# Patient Record
Sex: MALE | Race: WHITE | NOT HISPANIC OR LATINO | Employment: OTHER | URBAN - METROPOLITAN AREA
[De-identification: names, ages, dates, MRNs, and addresses within clinical notes are randomized per-mention and may not be internally consistent; named-entity substitution may affect disease eponyms.]

---

## 2018-07-03 ENCOUNTER — APPOINTMENT (EMERGENCY)
Dept: RADIOLOGY | Facility: HOSPITAL | Age: 68
DRG: 694 | End: 2018-07-03
Payer: MEDICARE

## 2018-07-03 ENCOUNTER — APPOINTMENT (INPATIENT)
Dept: RADIOLOGY | Facility: HOSPITAL | Age: 68
DRG: 694 | End: 2018-07-03
Payer: MEDICARE

## 2018-07-03 ENCOUNTER — HOSPITAL ENCOUNTER (INPATIENT)
Facility: HOSPITAL | Age: 68
LOS: 2 days | Discharge: HOME | DRG: 694 | End: 2018-07-05
Attending: GENERAL PRACTICE | Admitting: GENERAL PRACTICE
Payer: MEDICARE

## 2018-07-03 DIAGNOSIS — N13.5 URETERAL OBSTRUCTION: ICD-10-CM

## 2018-07-03 DIAGNOSIS — N13.30 HYDRONEPHROSIS: Primary | ICD-10-CM

## 2018-07-03 DIAGNOSIS — E66.01 MORBID OBESITY (HCC): ICD-10-CM

## 2018-07-03 DIAGNOSIS — R79.89 ELEVATED SERUM CREATININE: ICD-10-CM

## 2018-07-03 PROBLEM — K86.2 PANCREAS CYST: Status: ACTIVE | Noted: 2018-07-03

## 2018-07-03 PROBLEM — E11.49 TYPE 2 DIABETES MELLITUS WITH NEUROLOGIC COMPLICATION, WITH LONG-TERM CURRENT USE OF INSULIN (HCC): Status: ACTIVE | Noted: 2018-07-03

## 2018-07-03 PROBLEM — Z79.4 TYPE 2 DIABETES MELLITUS WITH NEUROLOGIC COMPLICATION, WITH LONG-TERM CURRENT USE OF INSULIN (HCC): Status: ACTIVE | Noted: 2018-07-03
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
## 2018-07-03 PROBLEM — I10 HYPERTENSION: Status: ACTIVE | Noted: 2018-07-03
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
## 2018-07-03 PROBLEM — E78.5 HYPERLIPIDEMIA: Status: ACTIVE | Noted: 2018-07-03

## 2018-07-03 LAB
ABO GROUP BLD: NORMAL
ANION GAP SERPL CALCULATED.3IONS-SCNC: 11 MMOL/L (ref 4–13)
APTT PPP: 25 SECONDS (ref 24–36)
BACTERIA UR QL AUTO: ABNORMAL /HPF
BASE EXCESS BLDA CALC-SCNC: 1 MMOL/L (ref -2–3)
BASOPHILS # BLD AUTO: 0.02 THOUSANDS/ΜL (ref 0–0.1)
BASOPHILS NFR BLD AUTO: 0 % (ref 0–1)
BILIRUB UR QL STRIP: NEGATIVE
BLD GP AB SCN SERPL QL: NEGATIVE
BUN SERPL-MCNC: 46 MG/DL (ref 5–25)
CA-I BLD-SCNC: 1.14 MMOL/L (ref 1.12–1.32)
CALCIUM SERPL-MCNC: 9.2 MG/DL (ref 8.3–10.1)
CHLORIDE SERPL-SCNC: 108 MMOL/L (ref 100–108)
CLARITY UR: CLEAR
CO2 SERPL-SCNC: 24 MMOL/L (ref 21–32)
COLOR UR: YELLOW
CREAT SERPL-MCNC: 3.39 MG/DL (ref 0.6–1.3)
EOSINOPHIL # BLD AUTO: 0.01 THOUSAND/ΜL (ref 0–0.61)
EOSINOPHIL NFR BLD AUTO: 0 % (ref 0–6)
ERYTHROCYTE [DISTWIDTH] IN BLOOD BY AUTOMATED COUNT: 13.6 % (ref 11.6–15.1)
GFR SERPL CREATININE-BSD FRML MDRD: 17 ML/MIN/1.73SQ M
GLUCOSE SERPL-MCNC: 173 MG/DL (ref 65–140)
GLUCOSE SERPL-MCNC: 185 MG/DL (ref 65–140)
GLUCOSE SERPL-MCNC: 188 MG/DL (ref 65–140)
GLUCOSE SERPL-MCNC: 193 MG/DL (ref 65–140)
GLUCOSE UR STRIP-MCNC: NEGATIVE MG/DL
HCO3 BLDA-SCNC: 25.5 MMOL/L (ref 24–30)
HCT VFR BLD AUTO: 42.6 % (ref 36.5–49.3)
HCT VFR BLD CALC: 40 % (ref 36.5–49.3)
HGB BLD-MCNC: 13.9 G/DL (ref 12–17)
HGB BLDA-MCNC: 13.6 G/DL (ref 12–17)
HGB UR QL STRIP.AUTO: ABNORMAL
IMM GRANULOCYTES # BLD AUTO: 0.07 THOUSAND/UL (ref 0–0.2)
IMM GRANULOCYTES NFR BLD AUTO: 1 % (ref 0–2)
INR PPP: 1.02 (ref 0.86–1.17)
KETONES UR STRIP-MCNC: ABNORMAL MG/DL
LEUKOCYTE ESTERASE UR QL STRIP: ABNORMAL
LYMPHOCYTES # BLD AUTO: 0.97 THOUSANDS/ΜL (ref 0.6–4.47)
LYMPHOCYTES NFR BLD AUTO: 9 % (ref 14–44)
MCH RBC QN AUTO: 29.4 PG (ref 26.8–34.3)
MCHC RBC AUTO-ENTMCNC: 32.6 G/DL (ref 31.4–37.4)
MCV RBC AUTO: 90 FL (ref 82–98)
MONOCYTES # BLD AUTO: 0.58 THOUSAND/ΜL (ref 0.17–1.22)
MONOCYTES NFR BLD AUTO: 5 % (ref 4–12)
NEUTROPHILS # BLD AUTO: 9.15 THOUSANDS/ΜL (ref 1.85–7.62)
NEUTS SEG NFR BLD AUTO: 85 % (ref 43–75)
NITRITE UR QL STRIP: NEGATIVE
NON-SQ EPI CELLS URNS QL MICRO: ABNORMAL /HPF
NRBC BLD AUTO-RTO: 0 /100 WBCS
PCO2 BLD: 27 MMOL/L (ref 21–32)
PCO2 BLD: 41 MM HG (ref 42–50)
PH BLD: 7.4 [PH] (ref 7.3–7.4)
PH UR STRIP.AUTO: 6 [PH] (ref 4.5–8)
PLATELET # BLD AUTO: 164 THOUSANDS/UL (ref 149–390)
PMV BLD AUTO: 9.3 FL (ref 8.9–12.7)
PO2 BLD: 62 MM HG (ref 35–45)
POTASSIUM BLD-SCNC: 4.7 MMOL/L (ref 3.5–5.3)
POTASSIUM SERPL-SCNC: 4.6 MMOL/L (ref 3.5–5.3)
PROT UR STRIP-MCNC: ABNORMAL MG/DL
PROTHROMBIN TIME: 13.5 SECONDS (ref 11.8–14.2)
RBC # BLD AUTO: 4.72 MILLION/UL (ref 3.88–5.62)
RBC #/AREA URNS AUTO: ABNORMAL /HPF
RH BLD: POSITIVE
SAO2 % BLD FROM PO2: 91 % (ref 95–98)
SODIUM BLD-SCNC: 144 MMOL/L (ref 136–145)
SODIUM SERPL-SCNC: 143 MMOL/L (ref 136–145)
SP GR UR STRIP.AUTO: 1.01 (ref 1–1.03)
SPECIMEN EXPIRATION DATE: NORMAL
SPECIMEN SOURCE: ABNORMAL
UROBILINOGEN UR QL STRIP.AUTO: 0.2 E.U./DL
WBC # BLD AUTO: 10.8 THOUSAND/UL (ref 4.31–10.16)
WBC #/AREA URNS AUTO: ABNORMAL /HPF

## 2018-07-03 PROCEDURE — 99222 1ST HOSP IP/OBS MODERATE 55: CPT | Performed by: SURGERY

## 2018-07-03 PROCEDURE — 84295 ASSAY OF SERUM SODIUM: CPT

## 2018-07-03 PROCEDURE — 81001 URINALYSIS AUTO W/SCOPE: CPT | Performed by: EMERGENCY MEDICINE

## 2018-07-03 PROCEDURE — 96375 TX/PRO/DX INJ NEW DRUG ADDON: CPT

## 2018-07-03 PROCEDURE — 82330 ASSAY OF CALCIUM: CPT

## 2018-07-03 PROCEDURE — 82948 REAGENT STRIP/BLOOD GLUCOSE: CPT

## 2018-07-03 PROCEDURE — 99222 1ST HOSP IP/OBS MODERATE 55: CPT | Performed by: INTERNAL MEDICINE

## 2018-07-03 PROCEDURE — 96374 THER/PROPH/DIAG INJ IV PUSH: CPT

## 2018-07-03 PROCEDURE — 90715 TDAP VACCINE 7 YRS/> IM: CPT | Performed by: EMERGENCY MEDICINE

## 2018-07-03 PROCEDURE — 85610 PROTHROMBIN TIME: CPT | Performed by: GENERAL PRACTICE

## 2018-07-03 PROCEDURE — 70450 CT HEAD/BRAIN W/O DYE: CPT

## 2018-07-03 PROCEDURE — 85025 COMPLETE CBC W/AUTO DIFF WBC: CPT | Performed by: GENERAL PRACTICE

## 2018-07-03 PROCEDURE — 99285 EMERGENCY DEPT VISIT HI MDM: CPT

## 2018-07-03 PROCEDURE — 72192 CT PELVIS W/O DYE: CPT

## 2018-07-03 PROCEDURE — 82947 ASSAY GLUCOSE BLOOD QUANT: CPT

## 2018-07-03 PROCEDURE — 99222 1ST HOSP IP/OBS MODERATE 55: CPT | Performed by: PHYSICIAN ASSISTANT

## 2018-07-03 PROCEDURE — 36415 COLL VENOUS BLD VENIPUNCTURE: CPT

## 2018-07-03 PROCEDURE — 71045 X-RAY EXAM CHEST 1 VIEW: CPT

## 2018-07-03 PROCEDURE — 85730 THROMBOPLASTIN TIME PARTIAL: CPT | Performed by: GENERAL PRACTICE

## 2018-07-03 PROCEDURE — 86901 BLOOD TYPING SEROLOGIC RH(D): CPT | Performed by: GENERAL PRACTICE

## 2018-07-03 PROCEDURE — 80048 BASIC METABOLIC PNL TOTAL CA: CPT | Performed by: GENERAL PRACTICE

## 2018-07-03 PROCEDURE — 85014 HEMATOCRIT: CPT

## 2018-07-03 PROCEDURE — 86900 BLOOD TYPING SEROLOGIC ABO: CPT | Performed by: GENERAL PRACTICE

## 2018-07-03 PROCEDURE — 82803 BLOOD GASES ANY COMBINATION: CPT

## 2018-07-03 PROCEDURE — 74177 CT ABD & PELVIS W/CONTRAST: CPT

## 2018-07-03 PROCEDURE — 99223 1ST HOSP IP/OBS HIGH 75: CPT | Performed by: GENERAL PRACTICE

## 2018-07-03 PROCEDURE — 86850 RBC ANTIBODY SCREEN: CPT | Performed by: GENERAL PRACTICE

## 2018-07-03 PROCEDURE — 90471 IMMUNIZATION ADMIN: CPT

## 2018-07-03 PROCEDURE — 71260 CT THORAX DX C+: CPT

## 2018-07-03 PROCEDURE — 72125 CT NECK SPINE W/O DYE: CPT

## 2018-07-03 PROCEDURE — 73030 X-RAY EXAM OF SHOULDER: CPT

## 2018-07-03 PROCEDURE — 84132 ASSAY OF SERUM POTASSIUM: CPT

## 2018-07-03 RX ORDER — ONDANSETRON 2 MG/ML
4 INJECTION INTRAMUSCULAR; INTRAVENOUS EVERY 4 HOURS PRN
Status: DISCONTINUED | OUTPATIENT
Start: 2018-07-03 | End: 2018-07-05 | Stop reason: HOSPADM

## 2018-07-03 RX ORDER — SODIUM CHLORIDE 9 MG/ML
100 INJECTION, SOLUTION INTRAVENOUS CONTINUOUS
Status: DISCONTINUED | OUTPATIENT
Start: 2018-07-03 | End: 2018-07-04

## 2018-07-03 RX ORDER — INSULIN ASPART 100 [IU]/ML
60 INJECTION, SUSPENSION SUBCUTANEOUS
Status: DISCONTINUED | OUTPATIENT
Start: 2018-07-03 | End: 2018-07-03

## 2018-07-03 RX ORDER — GABAPENTIN 300 MG/1
300 CAPSULE ORAL 3 TIMES DAILY
Status: DISCONTINUED | OUTPATIENT
Start: 2018-07-03 | End: 2018-07-05 | Stop reason: HOSPADM

## 2018-07-03 RX ORDER — ACETAMINOPHEN 325 MG/1
650 TABLET ORAL EVERY 6 HOURS PRN
Status: DISCONTINUED | OUTPATIENT
Start: 2018-07-03 | End: 2018-07-05 | Stop reason: HOSPADM

## 2018-07-03 RX ORDER — HEPARIN SODIUM 5000 [USP'U]/ML
5000 INJECTION, SOLUTION INTRAVENOUS; SUBCUTANEOUS EVERY 8 HOURS SCHEDULED
Status: DISCONTINUED | OUTPATIENT
Start: 2018-07-03 | End: 2018-07-05 | Stop reason: HOSPADM

## 2018-07-03 RX ORDER — FUROSEMIDE 40 MG/1
40 TABLET ORAL DAILY
Status: ON HOLD | COMMUNITY
End: 2018-07-05

## 2018-07-03 RX ORDER — GABAPENTIN 600 MG/1
300 TABLET ORAL 3 TIMES DAILY
COMMUNITY

## 2018-07-03 RX ORDER — ATORVASTATIN CALCIUM 40 MG/1
40 TABLET, FILM COATED ORAL
Status: DISCONTINUED | OUTPATIENT
Start: 2018-07-03 | End: 2018-07-05 | Stop reason: HOSPADM

## 2018-07-03 RX ORDER — METOCLOPRAMIDE HCL 10 MG/2 ML
20 SYRINGE (ML) INJECTION ONCE
Status: DISCONTINUED | OUTPATIENT
Start: 2018-07-03 | End: 2018-07-03

## 2018-07-03 RX ORDER — METOPROLOL SUCCINATE 100 MG/1
100 TABLET, EXTENDED RELEASE ORAL DAILY
Status: DISCONTINUED | OUTPATIENT
Start: 2018-07-04 | End: 2018-07-05 | Stop reason: HOSPADM

## 2018-07-03 RX ORDER — ONDANSETRON 2 MG/ML
4 INJECTION INTRAMUSCULAR; INTRAVENOUS ONCE
Status: COMPLETED | OUTPATIENT
Start: 2018-07-03 | End: 2018-07-03

## 2018-07-03 RX ORDER — INSULIN ASPART 100 [IU]/ML
30 INJECTION, SUSPENSION SUBCUTANEOUS
Status: DISCONTINUED | OUTPATIENT
Start: 2018-07-04 | End: 2018-07-05 | Stop reason: HOSPADM

## 2018-07-03 RX ORDER — PROMETHAZINE HYDROCHLORIDE 25 MG/1
12.5 TABLET ORAL EVERY 6 HOURS PRN
Status: DISCONTINUED | OUTPATIENT
Start: 2018-07-03 | End: 2018-07-03

## 2018-07-03 RX ORDER — METOCLOPRAMIDE HYDROCHLORIDE 5 MG/ML
10 INJECTION INTRAMUSCULAR; INTRAVENOUS ONCE
Status: COMPLETED | OUTPATIENT
Start: 2018-07-03 | End: 2018-07-03

## 2018-07-03 RX ORDER — OXYCODONE HYDROCHLORIDE 5 MG/1
2.5 TABLET ORAL EVERY 4 HOURS PRN
Status: DISCONTINUED | OUTPATIENT
Start: 2018-07-03 | End: 2018-07-05 | Stop reason: HOSPADM

## 2018-07-03 RX ORDER — FENTANYL CITRATE 50 UG/ML
25 INJECTION, SOLUTION INTRAMUSCULAR; INTRAVENOUS ONCE
Status: COMPLETED | OUTPATIENT
Start: 2018-07-03 | End: 2018-07-03

## 2018-07-03 RX ORDER — METOPROLOL SUCCINATE 100 MG/1
100 TABLET, EXTENDED RELEASE ORAL DAILY
COMMUNITY

## 2018-07-03 RX ORDER — AMLODIPINE BESYLATE 10 MG/1
10 TABLET ORAL DAILY
COMMUNITY

## 2018-07-03 RX ORDER — ATORVASTATIN CALCIUM 40 MG/1
40 TABLET, FILM COATED ORAL DAILY
COMMUNITY

## 2018-07-03 RX ORDER — PROMETHAZINE HYDROCHLORIDE 25 MG/ML
25 INJECTION, SOLUTION INTRAMUSCULAR; INTRAVENOUS EVERY 4 HOURS PRN
Status: DISCONTINUED | OUTPATIENT
Start: 2018-07-03 | End: 2018-07-05 | Stop reason: HOSPADM

## 2018-07-03 RX ORDER — AMLODIPINE BESYLATE 10 MG/1
10 TABLET ORAL DAILY
Status: DISCONTINUED | OUTPATIENT
Start: 2018-07-04 | End: 2018-07-05 | Stop reason: HOSPADM

## 2018-07-03 RX ORDER — INSULIN ASPART 100 [IU]/ML
65 INJECTION, SUSPENSION SUBCUTANEOUS
Status: DISCONTINUED | OUTPATIENT
Start: 2018-07-04 | End: 2018-07-05 | Stop reason: HOSPADM

## 2018-07-03 RX ADMIN — IOHEXOL 100 ML: 350 INJECTION, SOLUTION INTRAVENOUS at 12:14

## 2018-07-03 RX ADMIN — SODIUM CHLORIDE 100 ML/HR: 0.9 INJECTION, SOLUTION INTRAVENOUS at 18:48

## 2018-07-03 RX ADMIN — ACETAMINOPHEN 650 MG: 325 TABLET ORAL at 23:32

## 2018-07-03 RX ADMIN — HEPARIN SODIUM 5000 UNITS: 5000 INJECTION, SOLUTION INTRAVENOUS; SUBCUTANEOUS at 22:11

## 2018-07-03 RX ADMIN — INSULIN LISPRO 1 UNITS: 100 INJECTION, SOLUTION INTRAVENOUS; SUBCUTANEOUS at 22:11

## 2018-07-03 RX ADMIN — TETANUS TOXOID, REDUCED DIPHTHERIA TOXOID AND ACELLULAR PERTUSSIS VACCINE, ADSORBED 0.5 ML: 5; 2.5; 8; 8; 2.5 SUSPENSION INTRAMUSCULAR at 12:47

## 2018-07-03 RX ADMIN — GABAPENTIN 300 MG: 300 CAPSULE ORAL at 22:11

## 2018-07-03 RX ADMIN — HYDROMORPHONE HYDROCHLORIDE 0.5 MG: 1 INJECTION, SOLUTION INTRAMUSCULAR; INTRAVENOUS; SUBCUTANEOUS at 18:48

## 2018-07-03 RX ADMIN — ONDANSETRON 4 MG: 2 INJECTION INTRAMUSCULAR; INTRAVENOUS at 12:00

## 2018-07-03 RX ADMIN — FENTANYL CITRATE 25 MCG: 50 INJECTION, SOLUTION INTRAMUSCULAR; INTRAVENOUS at 12:10

## 2018-07-03 RX ADMIN — ONDANSETRON 4 MG: 2 INJECTION, SOLUTION INTRAMUSCULAR; INTRAVENOUS at 14:47

## 2018-07-03 RX ADMIN — METOCLOPRAMIDE 10 MG: 5 INJECTION, SOLUTION INTRAMUSCULAR; INTRAVENOUS at 18:49

## 2018-07-03 NOTE — CONSULTS
Consult - Urology   Santos Thao  1950, 70 y o  male MRN: [id number]    Unit/Bed#: [facility] 646-39 Encounter: [id number]    Fall down stairs   Assessment & Plan    Could also account for flank pain, given recent trauma  Patient however reports flank pain that preceded the fall  Elevated serum creatinine   Assessment & Plan    ESTELLA on CKD  Baseline Cr 1 7  Ureteral obstruction   Assessment & Plan    Possible ureteral obstruction from 6 x 3 mm stone located either at the distal end of the right ureter or in the bladder, having recently passed  Confounding with difficult clinical picture in the presence of chronic kidney disease with acute renal injury on top of chronic kidney disease  He was given a dye load in the trauma [place] when these images were performed  His baseline creatinine is 1 7 and his creatinine is elevated to 3 39  Plan:  Prone CT pelvis to evaluate if distal stone is located in the bladder  * Hydronephrosis of right kidney   Assessment & Plan    Mild to moderate right hydronephrosis on CT with ureteral calculus x2   2 mm proximal right ureteral calculus and 3 x 6 mm distal right ureteral calculus versus stone in the bladder  Bedside rounds performed with Ama Santos RN  Discussed with Dr Ulices Li and Maggie Ferro with Nephrology  Subjective/Objective     Subjective:   History obtained from chart review, discussion with patient, discussion with [facility] and Nephrology and RN caring for the patient  This is a 25-year-old morbidly obese male being admitted to the hospital secondary to a trauma after falling down 12 stairs  As part of his workup, CT was performed  His past medical history is pertinent for insulin-dependent diabetes, morbid obesity, baseline CKD, history of kidney stones  He reports a renal ultrasound in the last couple of days at [facility] but these records are not available    He sees Nephrology as an outpatient also at [facility] and his baseline creatinine appears to be 1 7 after conversations with Nephrology  Urology just to see him relative to CT findings of right moderate hydronephrosis and right ureteral calculi x2, 2 mm right proximal ureteral stone and 3 x 6 mm distal right ureteral stone versus bladder stone near the ureteral orifice on the right  Patient reports a history of kidney stones requiring stent in the past   He also reports a renal ultrasound, performed for an unknown reason, recently  He reports he has had flank pain since that renal ultrasound was performed last Thursday and noted that the technician that was performing that ultrasound had the push quite deeply given his obesity in order to visualize the appropriate structures  He notes significant discomfort in his suprapubic area with ultrasound probe to that area last Thursday  He denies any renal colic prior to that ultrasound  He notes after the ultrasound he developed bilateral flank pain and back discomfort  On Saturday, 3 days ago it localized to the right side  He complains of nausea and vomiting  His last p o  intake was last night at dinner, and he was subsequently sick  He has had nothing to eat or drink today  He reports refractory nausea after multiple doses of Zofran in the ER  Currently he is voiding without dysuria, gross hematuria or other complaints  He reports he has been voiding well since his stent was placed 1 year ago and subsequently removed  However, in the last 2-4 days he reports a new onset of urgency and incontinence with an inability to make it to the bathroom  He reports a history significant for kidney stones requiring stent  His last intervention from Urology was 1 year ago and ultrasound on Thursday was for evaluation of stone burden but he denies any symptoms prior to that  Review of Systems   Constitutional: Negative for activity change, appetite change, chills, fatigue and fever     HENT: Negative for congestion, ear pain, mouth sores, nosebleeds, sneezing and sore throat  Eyes: Negative for pain and redness  Respiratory: Negative for apnea, cough, choking and shortness of breath  Cardiovascular: Negative for palpitations  Gastrointestinal: Negative for abdominal pain, blood in stool, constipation, diarrhea, nausea and vomiting  Endocrine: Negative for cold intolerance, heat intolerance and polyuria  Genitourinary: Positive for flank pain  Negative for difficulty urinating, dysuria, frequency, hematuria and urgency  Musculoskeletal: Positive for back pain  Negative for arthralgias, gait problem and myalgias  Skin: Negative for color change, pallor, rash and wound  Allergic/Immunologic: Negative for immunocompromised state  Neurological: Negative for dizziness, seizures, syncope, numbness and headaches  Hematological: Negative for adenopathy  Does not bruise/bleed easily  Psychiatric/Behavioral: Negative for agitation, behavioral problems, confusion, hallucinations and sleep disturbance  The patient is not nervous/anxious  All other systems reviewed and are negative  Objective:  Vitals: Blood pressure 141/72, pulse 95, temperature 97 9 °F (36 6 °C), temperature source Oral, resp  rate 20, height 5' 8" (1 727 m), weight (!) 148 kg (327 lb 1 6 oz), SpO2 96 %  ,Body mass index is 49 74 kg/m²  No intake or output data in the 24 hours ending 07/03/18 1615    Invasive Devices     Peripheral Intravenous Line            Peripheral IV 07/03/18 Left Antecubital less than 1 day    Peripheral IV 07/03/18 Left Forearm less than 1 day                Physical Exam   Constitutional: He is oriented to person, place, and time  He appears well-developed  He is cooperative  He does not appear ill  No distress  Morbidly obese 39-year-old male in no acute distress  Pleasant, well-appearing  Out of bed to the chair  Wife at the bedside    Answered questions appropriately throughout the examination  Able to participate in exam   Currently complaining of nausea but no significant pain  HENT:   Head: Normocephalic and atraumatic  Moist mucous membranes  Eyes: Conjunctivae and EOM are normal    Neck: Normal range of motion  Neck supple  No tracheal deviation present  Cardiovascular: Normal rate, regular rhythm and normal heart sounds  No murmur heard  Pulmonary/Chest: Effort normal and breath sounds normal  No respiratory distress  He has no wheezes  Good airflow bilaterally on deep inspiration  Abdominal: Soft  He exhibits no distension and no mass  There is no tenderness  Obese abdomen with hypoactive bowel sounds x4 quadrants  No rigidity rebound or guarding  No significant suprapubic tenderness or fullness noted  Deep palpation limited secondary to body habitus  Genitourinary:   Genitourinary Comments: Bilateral CVA without significant tenderness  Musculoskeletal: Normal range of motion  He exhibits no edema  Neurological: He is alert and oriented to person, place, and time  Skin: Skin is warm and dry  No rash noted  He is not diaphoretic  No erythema  No pallor  Psychiatric: He has a normal mood and affect  His behavior is normal  Judgment and thought content normal    Nursing note and vitals reviewed  History:    Past Medical History:   Diagnosis Date    Diabetes mellitus ([address] )     Hypercholesteremia     Hypertension     Kidney stone      Past Surgical History:   Procedure Laterality Date    EAR SURGERY       History reviewed  No pertinent family history    Social History     Social History    Marital status: /Civil Union     Spouse name: N/A    Number of children: N/A    Years of education: N/A     Social History Main Topics    Smoking status: Never Smoker    Smokeless tobacco: Never Used    Alcohol use No    Drug use: No    Sexual activity: Not Asked     Other Topics Concern    None     Social History Narrative    None Imaging:  Trauma CT chest abdomen pelvis from 7/3/2018 reviewed, both report and images  Enlarged right kidney with delayed nephrogram, moderate hydronephrosis and hydroureter  Right 2mm proximal ureteral calculus with 3x6mm distal right ureteral stone, so low it may be in the bladder  Imaging reviewed - both report and images personally reviewed       Labs:  Recent Labs      07/03/18   1156   WBC  10 80*     Recent Labs      07/03/18   1154  07/03/18   1156   HGB  13 6  13 9       Recent Labs      07/03/18   1156   CREATININE  3 39*     Mary Rodríguez PA-C  Date: 7/3/2018 Time: 4:15 PM

## 2018-07-03 NOTE — ASSESSMENT & PLAN NOTE
Mild to moderate right hydronephrosis on CT with ureteral calculus x2   2 mm proximal right ureteral calculus and 3 x 6 mm distal right ureteral calculus versus stone in the bladder
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
## 2018-07-03 NOTE — ASSESSMENT & PLAN NOTE
· CT revealed moderate hydronephrosis and hydroureter with a 2mm calculus in the proximal ureter and 5clr4db calculus either in the intramural portion of the distal most right ureter or the urinary bladder causing or has recently caused distal ureteral obstruction per the result report  · Urology consult placed - discussed with their on-call PA-C  · Patient reports that he had a recent (last Thursday) renal US   Unfortunately, I am unable to find any records from [facility] with these results or why this test was performed and patient is unsure himself

## 2018-07-03 NOTE — ASSESSMENT & PLAN NOTE
· Patient reports his home insulin regimen is as follows:  · 65 units 70/30 QAM and 60 units 70/30 at dinner  · He also takes metformin BID  · Order home insulin regimen  · Orals will be on hold while hospitalized  · Add SSI coverage  · Check HbA1c  · Diabetic diet once no longer NPO  · Continue home dose gabapentin for peripheral neuropathy

## 2018-07-03 NOTE — ASSESSMENT & PLAN NOTE
· Creatinine found to be 3 39 on admission  · Unclear of patient's baseline as I cannot find any records from [facility]  · He reports seeing Dr Chriss Holman of [facility]  · He had a recent (last Thursday) renal US - unable to find these results  · Nephrology consult  · Suspect patient has some underlying CKD, but this likely represents an ESTELLA in the setting of the above and poor PO intake with vomiting  · Metformin, losartan, and furosemide will be on hold for now

## 2018-07-03 NOTE — ASSESSMENT & PLAN NOTE
Possible ureteral obstruction from 6 x 3 mm stone located either at the distal end of the right ureter or in the bladder, having recently passed  Confounding with difficult clinical picture in the presence of chronic kidney disease with acute renal injury on top of chronic kidney disease  He was given a dye load in the trauma [place] when these images were performed  His baseline creatinine is 1 7 and his creatinine is elevated to 3 39  Plan:  Prone CT pelvis to evaluate if distal stone is located in the bladder

## 2018-07-03 NOTE — ASSESSMENT & PLAN NOTE
· CT of the chest abdomen and pelvis revealed 1 2 cm exophytic solid-appearing nodule arising from the pancreatic tail anteriorly image 2/55   Partially solid and cystic nodule arising from the cephalad aspect of the pancreatic tail measuring 1 8 cm image 2/56 "  Nonemergent, outpatient MRI was recommended by radiologist

## 2018-07-03 NOTE — ASSESSMENT & PLAN NOTE
· Home regimen is as follows per med-rec:  · Metoprolol succinate 100 mg daily, losartan 100 mg daily, amlodipine 10 mg daily  · Losartan will be on hold for now given renal function

## 2018-07-03 NOTE — ED NOTES
Patient updated on plan of care  Aware that urology and nephorolgy need to see him and that he needs to remain NPO    Patient and his wife aware that he is being admitted to the hospital   Voids 600 mls in urinal      Kelly Barrett RN  07/03/18 1232

## 2018-07-03 NOTE — PHYSICIAN ADVISOR
Current patient class: Inpatient  The patient is currently on Hospital Day: 1 at [address] [place] [address]      The patient was admitted to the hospital at [phone] on 7/3/18 for the following diagnosis:  Morbid obesity ([address] ) [E66 01]  Hydronephrosis [N13 30]  Ureteral obstruction [N13 5]  Injuries [T14 90XA]  Elevated serum creatinine [R79 89]       There is documentation in the medical record of an expected length of stay of at least 2 midnights  The patient is therefore expected to satisfy the 2 midnight benchmark and given the 2 midnight presumption is appropriate for INPATIENT ADMISSION  Given this expectation of a satisfying stay, CMS instructs us that the patient is most often appropriate for inpatient admission under part A provided medical necessity is documented in the chart  After review of the relevant documentation, labs, vital signs and test results, the patient is appropriate for INPATIENT ADMISSION  Admission to the hospital as an inpatient is a complex decision making process which requires the practitioner to consider the patients presenting complaint, history and physical examination and all relevant testing  With this in mind, in this case, the patient was deemed appropriate for INPATIENT ADMISSION  After review of the documentation and testing available at the time of the admission I concur with this clinical determination of medical necessity  Rationale is as follows:    Patient is a 57-year-old male who presented to the emergency room on 07/03/2018 after a traumatic fall down the steps  On CT scan imaging he was noted to have moderate right-sided hydronephrosis and hydroureter with suspected obstructing stone  Patient had complaints of nausea and vomiting and right-sided flank pain for the previous 6 days    Urology is consulted with recommendation for follow-up CT scan to localize the stone and possible intervention for the hydronephrosis depending on imaging findings  Patient is also found to be in acute renal failure with creatinine of 3 39 with a baseline of around 1 7  He is treated with intravenous fluids  Nephrology is consulted  Patient required intravenous fentanyl and hydromorphone for pain control  Inpatient hospitalization is appropriate and patient is expected to remain hospitalized for over 2 midnights      The patients vitals on arrival were ED Triage Vitals   Temperature Pulse Respirations Blood Pressure SpO2   07/03/18 1145 07/03/18 1145 07/03/18 1145 07/03/18 1145 07/03/18 1145   (!) 97 °F (36 1 °C) 92 20 (!) 181/76 99 %      Temp Source Heart Rate Source Patient Position - Orthostatic VS BP Location FiO2 (%)   07/03/18 1551 07/03/18 1300 07/03/18 1145 -- --   Oral Monitor Lying        Pain Score       07/03/18 1210       6           Past Medical History:   Diagnosis Date    Diabetes mellitus ([facility] [address] )     Hypercholesteremia     Hypertension     Kidney stone      Past Surgical History:   Procedure Laterality Date    EAR SURGERY             Consults have been placed to:   IP CONSULT TO UROLOGY  IP CONSULT TO NEPHROLOGY  IP CONSULT TO NUTRITION SERVICES  IP CONSULT TO CASE MANAGEMENT    Vitals:    07/03/18 1440 07/03/18 1444 07/03/18 1502 07/03/18 1551   BP:   166/79 141/72   Pulse: 92 102 90 95   Resp:   20 20   Temp:    97 9 °F (36 6 °C)   TempSrc:    Oral   SpO2:   95% 96%   Weight:    (!) 148 kg (327 lb 1 6 oz)   Height:    5' 8" (1 727 m)       Most recent labs:    Recent Labs      07/03/18   1156   WBC  10 80*   HGB  13 9   HCT  42 6   PLT  164   K  4 6   NA  143   CALCIUM  9 2   BUN  46*   CREATININE  3 39*   INR  1 02       Scheduled Meds:  Current Facility-Administered Medications:  acetaminophen 650 mg Oral Q6H PRN Rahul Ramírez PA-C   [START ON 7/4/2018] amLODIPine 10 mg Oral Daily Rahul Ramírez PA-C   atorvastatin 40 mg Oral Daily With [employer], KENDALL   gabapentin 300 mg Oral TID Rahul Ramírez PA-C   heparin (porcine) 5,000 Units Subcutaneous [facility] Andrea Thomas PA-C   HYDROmorphone 0 5 mg Intravenous Once Easton Lopez PA-C   insulin aspart protamine-insulin aspart 60 Units Subcutaneous Before [place], [place]   [START ON 7/4/2018] insulin aspart protamine-insulin aspart 65 Units Subcutaneous Daily Before Breakfast Andrea Thomas PA-C   insulin lispro 1-5 Units Subcutaneous TID AC Andrea Thomas PA-C   metoclopramide 10 mg Intravenous Once Easton Lopez PA-C   [START ON 7/4/2018] metoprolol succinate 100 mg Oral Daily Andrea Thomas PA-C   ondansetron 4 mg Intravenous Q4H PRN Andrea Thomas PA-C   oxyCODONE 2 5 mg Oral Q4H PRN Andrea Thomas PA-C   promethazine 25 mg Intravenous Q4H PRN Easton Lopez PA-C   sodium chloride 100 mL/hr Intravenous Continuous Andrea Thomas PA-C     Continuous Infusions:  sodium chloride 100 mL/hr     PRN Meds:   acetaminophen    ondansetron    oxyCODONE    promethazine    Surgical procedures (if appropriate):

## 2018-07-03 NOTE — CONSULTS
Consultation - Nephrology   Yuko Sánchez  70 y o  male MRN: [id number]  Unit/Bed#: ED 26 Encounter: [id number]    ASSESSMENT and PLAN:  1  Acute kidney injury:  Multifactorial, Suspect secondary to obstruction with nephrolithiasis, prerenal with decreased oral intake with nausea vomiting, along with medications to include losartan, furosemide, and metformin  Also received IV contrast  -CT scan with IV contrast: There is moderate hydronephrosis and hydroureter, with nephrolithiasis  -await Urology evaluation and recommendations  -will check urinalysis with micro  -avoid nephrotoxins  -avoid hypotension  -follow I/O, lab values in volume status  -agree with holding losartan, Furosemide, and metformin for now    2  Chronic kidney disease III:  Baseline creatinine 1 7-1 8 dating back to 2016  -follows Dr Allen Sargent at [facility] seen December, 2017  -etiology likely secondary to diabetes and hypertension    3  Hypertension:  BP at slightly elevated  -metoprolol and amlodipine continue per primary  -losartan on hold  -trend for now with increased pain status post fall    4  Right hydronephrosis:  CT scan reported nephrolithiasis  -urology consulted awaiting full evaluation and recommendations  -may require Benton catheter-will trend  -follows urology at [facility]    5  Status post fall:  Imaging did not reveal fractures or abnormalities    6  Incidental pancreatic cysts:  Seen on CT scan  -radiology recommended outpatient MRI follow-up      HISTORY OF PRESENT ILLNESS:  Requesting Physician: Dayami Summers DO  Reason for Consult: Cindy Lucas  is a 70 y o  male who was admitted after sustaining a fall  He reports his leg gave out and fell down 12 steps which was witnessed by his wife    He has a history of chronic kidney disease and follows Dr Allen Sargent from [address], hypertension, hypercholesteremia, diabetes, nephrolithiasis with previous stent placement and follows Urology at [facility]   Blood work revealed elevated creatinine 3 39 and a renal consultation is requested today for assistance in the management of ESTELLA  Per [facility] baseline creatinine 1 70 to 1 8 dating back to 2016  On discussion the patient states he recently was being worked up for kidney stones by his urologist and underwent ultrasound last Thursday at [facility], however he is not aware results  Unfortunately, at this time no hospital records are available through [facility]  The patient reports increased abdominal pain, left flank pain since his ultrasound, nausea and vomiting, decreased appetite, polyuria, and increased generalized pain  He reports taking all his medications to include furosemide, losartan, amlodipine, metformin, gabapentin and potassium  Medication list has been reviewed  He denies recent fevers, illness, weight loss, chest pain, shortness of Breath, dizziness, lightheadedness, dysuria, hematuria, or foaming of the urine  He reports taking at the most three tablets of Motrin 200 mg each since Thursday secondary to left flank pain along with Percocet without relief  CT scan of the chest abdomen pelvis with IV contrast reviewed  PAST MEDICAL HISTORY:  Past Medical History:   Diagnosis Date    Diabetes mellitus ([address] )     Kidney stone        PAST SURGICAL HISTORY:  Past Surgical History:   Procedure Laterality Date    EAR SURGERY         ALLERGIES:  Allergies   Allergen Reactions    Penicillins        SOCIAL HISTORY:  History   Alcohol Use No     History   Drug Use No     History   Smoking Status    Never Smoker   Smokeless Tobacco    Never Used       FAMILY HISTORY:  History reviewed  No pertinent family history      MEDICATIONS:    Current Facility-Administered Medications:     ondansetron (ZOFRAN) injection 4 mg, 4 mg, Intravenous, Q4H PRN, Octaviano Wilson PA-C, 4 mg at 07/03/18 1042    Current Outpatient Prescriptions:     amLODIPine (NORVASC) 10 mg tablet, Take 10 mg by mouth daily, Disp: , Rfl:     atorvastatin (LIPITOR) 40 mg tablet, Take 40 mg by mouth daily, Disp: , Rfl:     furosemide (LASIX) 40 mg tablet, Take 40 mg by mouth daily, Disp: , Rfl:     gabapentin (NEURONTIN) 600 MG tablet, Take 300 mg by mouth 3 (three) times a day, Disp: , Rfl:     insulin NPH-insulin regular (NovoLIN 70/30) 100 units/mL subcutaneous injection, Inject 65 Units under the skin 2 (two) times a day before meals, Disp: , Rfl:     LOSARTAN POTASSIUM PO, Take 100 mg by mouth daily, Disp: , Rfl:     metFORMIN (GLUCOPHAGE) 500 mg tablet, Take 500 mg by mouth 2 (two) times a day with meals, Disp: , Rfl:     metoprolol succinate (TOPROL-XL) 100 mg 24 hr tablet, Take 100 mg by mouth daily, Disp: , Rfl:     POTASSIUM CITRATE ER PO, Take 15 mEq by mouth 2 (two) times a day, Disp: , Rfl:     REVIEW OF SYSTEMS:  All the systems were reviewed and were negative except as documented on the HPI      PHYSICAL EXAM:  Current Weight: Weight - Scale: (!) 153 kg (338 lb)  First Weight: Weight - Scale: (!) 153 kg (338 lb)  Vitals:    07/03/18 1435 07/03/18 1440 07/03/18 1444 07/03/18 1502   BP: 167/83   166/79   Pulse:  92 102 90   Resp:    20   Temp:       SpO2:    95%   Weight:         No intake or output data in the 24 hours ending 07/03/18 1526     General: cooperative, not in acute distress  Skin: no rash, warm and dry  Eyes: pale conjunctivae, anicteric sclerae  ENT: moist lips and mucous membranes  Neck: supple, no JVD noted  Chest: clear breath sounds, decreased bases, no wheeze or crackles  CVS:  normal rate, regular rhythm  Abdomen: soft, non-tender, non-distended, normoactive bowel sounds  Extremities:  Trace edema bilaterally, right lower leg deformity; status post skin graft  Neuro: awake, alert  Psych: appropriate affect       Lab Results:     Results from last 7 days  Lab Units 07/03/18  1156 07/03/18  1154   WBC Thousand/uL 10 80*  --    HEMOGLOBIN g/dL 13 9  --    I STAT HEMOGLOBIN g/dl  --  13 6   HEMATOCRIT % 42 6  --    PLATELETS Thousands/uL 164  --    SODIUM mmol/L 143  --    POTASSIUM mmol/L 4 6  --    CHLORIDE mmol/L 108  --    CO2 mmol/L 24  --    BUN mg/dL 46*  --    CREATININE mg/dL 3 39*  --    CALCIUM mg/dL 9 2  --    GLUCOSE RANDOM mg/dL 185*  --    GLUCOSE, ISTAT mg/dl  --  188*       Other Studies:

## 2018-07-03 NOTE — ASSESSMENT & PLAN NOTE
Could also account for flank pain, given recent trauma  Patient however reports flank pain that preceded the fall

## 2018-07-03 NOTE — H&P
H&P Exam - Trauma   Liza Reynolds  70 y o  male MRN: [id number]  Unit/Bed#: ED 26 Encounter: [id number]    Assessment/Plan   Trauma Alert: Level B  Model of Arrival: Ambulance  Trauma Team: Attending Donnie Farrar, resident Dr Inocente Whitman   Consultants: None    Trauma Active Problems:   Abrasions on Rt shoulder and Rt arm  -Td     Abdominal pain  -CT H, C/A/P/C spine NEG  -25 Fentanyl    Nausea x 3 days  -4mg Zofran  ESTELLA  -Admit to medicine  -    Trauma Plan:    -Admit to medicine for evaluation of dehydration due to N/V x 3 days    Chief Complaint: Back pain, Rt Shoulder pain, abdominal pain, T/L spine tenderness    History of Present Illness   HPI:  Liza Reynolds  is a 70 y o  male who was a LV B trauma alert who presents via ambulance after a fall down approx 25 stairs  Patient states that he was feeling  nauseous for the past 3 days and was leaving his home when his Rt leg gave out, causing him to fall  He states that he fell down the 25 steps and knocked off the chair lift from the wall  Pt takes ASA and not other thinners  Denies chest pain, SOB, diarrhea, fevers, chills  Mechanism:Fall    Review of Systems   HENT: Negative  Cardiovascular: Negative  Gastrointestinal: Positive for abdominal pain and nausea  Reports nausea and vomiting for a few days   Endocrine: Negative  Genitourinary: Negative  History of kidney stones, per patient had an ultrasound completed last week   Musculoskeletal:        Right shoulder abrasion   Skin:        Old skin graft to RLE, patient reports he was run over by a horse and varriage years ago   Allergic/Immunologic: Negative  Hematological: Negative  Psychiatric/Behavioral: Negative  Historical Information   History is unobtainable from the patient and EMS staff        Past Medical History:   Diagnosis Date    Diabetes mellitus ([facility] [address] )     Kidney stone      Past Surgical History:   Procedure Laterality Date    EAR SURGERY Social History   History   Alcohol Use No     History   Drug Use No     History   Smoking Status    Never Smoker   Smokeless Tobacco    Never Used     Immunization History   Administered Date(s) Administered    Tdap 07/03/2018     Last Tetanus: Updated today  Family History: Non-contributory         Meds/Allergies   all current active meds have been reviewed    Allergies   Allergen Reactions    Penicillins          PHYSICAL EXAM    PE limited by: na    Objective   Vitals:   First set: Temperature: (!) 97 °F (36 1 °C) (07/03/18 1145)  Pulse: 92 (07/03/18 1145)  Respirations: 20 (07/03/18 1145)  Blood Pressure: (!) 181/76 (07/03/18 1145)    Primary Survey:   (A) Airway: Intact  (B) Breathing: CTA b/l  (C) Circulation: Pulses:   carotid  2/4, pedal  2/4, radial  2/4 and femoral  2/4  (D) Disabliity:  GCS Total:  15  (E) Expose:  Completed    Secondary Survey: (Click on Physical Exam tab above)  Physical Exam   Constitutional: He is oriented to person, place, and time  He appears well-developed and well-nourished  No distress  HENT:   Head: Normocephalic  Eyes: Conjunctivae and EOM are normal  Pupils are equal, round, and reactive to light  Neck: Neck supple  Cardiovascular: Normal rate  No murmur heard  Pulmonary/Chest: Effort normal and breath sounds normal  No stridor  No respiratory distress  He has no wheezes  He has no rales  He exhibits tenderness  TTP over Rt and LT chest wall, No flail chest or crepitus note  Abdominal: Soft  He exhibits no distension  There is tenderness in the left upper quadrant  There is no rebound  Musculoskeletal: He exhibits no edema, tenderness or deformity  Neurological: He is alert and oriented to person, place, and time  No cranial nerve deficit or sensory deficit  GCS eye subscore is 4  GCS verbal subscore is 5  GCS motor subscore is 6  Skin: Skin is warm and dry  He is not diaphoretic  Psychiatric: He has a normal mood and affect   His behavior is normal  Judgment and thought content normal    Nursing note and vitals reviewed        Invasive Devices     Peripheral Intravenous Line            Peripheral IV 07/03/18 Left Antecubital less than 1 day    Peripheral IV 07/03/18 Left Forearm less than 1 day                Lab Results: ISTAT: No components found for: VBG  Imaging/EKG Studies: FAST: Neg, Chest X-Ray: NEG, CT Chest: Neg, CT Scan Abdomen/Pelvis: Neg CT head NEG, CT C spine NEG     Code Status: No Order  Advance Directive and Living Will:      Power of :    POLST:
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
## 2018-07-03 NOTE — ASSESSMENT & PLAN NOTE
· See CT findings as per above  · Urology consult pending  · Patient was made NPO in ED  · Patient reports that he had kidney stones in the past, had a stent placed which he reports was later removed

## 2018-07-03 NOTE — SOCIAL WORK
CM responded to trauma alert  Pt was brought to the trauma bay via [facility] s/p fall down 12 steps at home  Fall was witnessed by wife  -LOC  Family en route
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
## 2018-07-04 LAB
ANION GAP SERPL CALCULATED.3IONS-SCNC: 5 MMOL/L (ref 4–13)
BUN SERPL-MCNC: 41 MG/DL (ref 5–25)
CALCIUM SERPL-MCNC: 8.2 MG/DL (ref 8.3–10.1)
CHLORIDE SERPL-SCNC: 107 MMOL/L (ref 100–108)
CK MB SERPL-MCNC: 1.1 % (ref 0–2.5)
CK MB SERPL-MCNC: 11.1 NG/ML (ref 0–5)
CK SERPL-CCNC: 979 U/L (ref 39–308)
CO2 SERPL-SCNC: 26 MMOL/L (ref 21–32)
CREAT SERPL-MCNC: 2.48 MG/DL (ref 0.6–1.3)
ERYTHROCYTE [DISTWIDTH] IN BLOOD BY AUTOMATED COUNT: 13.9 % (ref 11.6–15.1)
EST. AVERAGE GLUCOSE BLD GHB EST-MCNC: 146 MG/DL
GFR SERPL CREATININE-BSD FRML MDRD: 25 ML/MIN/1.73SQ M
GLUCOSE SERPL-MCNC: 112 MG/DL (ref 65–140)
GLUCOSE SERPL-MCNC: 117 MG/DL (ref 65–140)
GLUCOSE SERPL-MCNC: 117 MG/DL (ref 65–140)
GLUCOSE SERPL-MCNC: 118 MG/DL (ref 65–140)
GLUCOSE SERPL-MCNC: 123 MG/DL (ref 65–140)
GLUCOSE SERPL-MCNC: 137 MG/DL (ref 65–140)
HBA1C MFR BLD: 6.7 % (ref 4.2–6.3)
HCT VFR BLD AUTO: 37.2 % (ref 36.5–49.3)
HGB BLD-MCNC: 12 G/DL (ref 12–17)
MAGNESIUM SERPL-MCNC: 2.1 MG/DL (ref 1.6–2.6)
MCH RBC QN AUTO: 29.9 PG (ref 26.8–34.3)
MCHC RBC AUTO-ENTMCNC: 32.3 G/DL (ref 31.4–37.4)
MCV RBC AUTO: 93 FL (ref 82–98)
PHOSPHATE SERPL-MCNC: 3.5 MG/DL (ref 2.3–4.1)
PLATELET # BLD AUTO: 141 THOUSANDS/UL (ref 149–390)
PMV BLD AUTO: 10.2 FL (ref 8.9–12.7)
POTASSIUM SERPL-SCNC: 5.1 MMOL/L (ref 3.5–5.3)
RBC # BLD AUTO: 4.02 MILLION/UL (ref 3.88–5.62)
SODIUM SERPL-SCNC: 138 MMOL/L (ref 136–145)
WBC # BLD AUTO: 7.77 THOUSAND/UL (ref 4.31–10.16)

## 2018-07-04 PROCEDURE — 82948 REAGENT STRIP/BLOOD GLUCOSE: CPT

## 2018-07-04 PROCEDURE — 80048 BASIC METABOLIC PNL TOTAL CA: CPT | Performed by: NURSE PRACTITIONER

## 2018-07-04 PROCEDURE — G8979 MOBILITY GOAL STATUS: HCPCS

## 2018-07-04 PROCEDURE — 82550 ASSAY OF CK (CPK): CPT | Performed by: INTERNAL MEDICINE

## 2018-07-04 PROCEDURE — 83735 ASSAY OF MAGNESIUM: CPT | Performed by: NURSE PRACTITIONER

## 2018-07-04 PROCEDURE — 97162 PT EVAL MOD COMPLEX 30 MIN: CPT

## 2018-07-04 PROCEDURE — 85027 COMPLETE CBC AUTOMATED: CPT | Performed by: PHYSICIAN ASSISTANT

## 2018-07-04 PROCEDURE — 99232 SBSQ HOSP IP/OBS MODERATE 35: CPT | Performed by: UROLOGY

## 2018-07-04 PROCEDURE — 84100 ASSAY OF PHOSPHORUS: CPT | Performed by: NURSE PRACTITIONER

## 2018-07-04 PROCEDURE — 82553 CREATINE MB FRACTION: CPT | Performed by: INTERNAL MEDICINE

## 2018-07-04 PROCEDURE — G8978 MOBILITY CURRENT STATUS: HCPCS

## 2018-07-04 PROCEDURE — 83036 HEMOGLOBIN GLYCOSYLATED A1C: CPT | Performed by: PHYSICIAN ASSISTANT

## 2018-07-04 PROCEDURE — 99232 SBSQ HOSP IP/OBS MODERATE 35: CPT | Performed by: NURSE PRACTITIONER

## 2018-07-04 PROCEDURE — 99232 SBSQ HOSP IP/OBS MODERATE 35: CPT | Performed by: INTERNAL MEDICINE

## 2018-07-04 RX ADMIN — SODIUM CHLORIDE 100 ML/HR: 0.9 INJECTION, SOLUTION INTRAVENOUS at 04:25

## 2018-07-04 RX ADMIN — GABAPENTIN 300 MG: 300 CAPSULE ORAL at 09:13

## 2018-07-04 RX ADMIN — OXYCODONE HYDROCHLORIDE 2.5 MG: 5 TABLET ORAL at 17:26

## 2018-07-04 RX ADMIN — ACETAMINOPHEN 650 MG: 325 TABLET ORAL at 09:22

## 2018-07-04 RX ADMIN — INSULIN ASPART 30 UNITS: 100 INJECTION, SUSPENSION SUBCUTANEOUS at 17:24

## 2018-07-04 RX ADMIN — ATORVASTATIN CALCIUM 40 MG: 40 TABLET, FILM COATED ORAL at 17:24

## 2018-07-04 RX ADMIN — HEPARIN SODIUM 5000 UNITS: 5000 INJECTION, SOLUTION INTRAVENOUS; SUBCUTANEOUS at 22:11

## 2018-07-04 RX ADMIN — GABAPENTIN 300 MG: 300 CAPSULE ORAL at 22:11

## 2018-07-04 RX ADMIN — GABAPENTIN 300 MG: 300 CAPSULE ORAL at 15:13

## 2018-07-04 RX ADMIN — OXYCODONE HYDROCHLORIDE 2.5 MG: 5 TABLET ORAL at 06:39

## 2018-07-04 RX ADMIN — AMLODIPINE BESYLATE 10 MG: 10 TABLET ORAL at 09:13

## 2018-07-04 RX ADMIN — INSULIN ASPART 65 UNITS: 100 INJECTION, SUSPENSION SUBCUTANEOUS at 09:13

## 2018-07-04 RX ADMIN — METOPROLOL SUCCINATE 100 MG: 100 TABLET, EXTENDED RELEASE ORAL at 09:13

## 2018-07-04 RX ADMIN — HEPARIN SODIUM 5000 UNITS: 5000 INJECTION, SOLUTION INTRAVENOUS; SUBCUTANEOUS at 13:57

## 2018-07-04 RX ADMIN — HEPARIN SODIUM 5000 UNITS: 5000 INJECTION, SOLUTION INTRAVENOUS; SUBCUTANEOUS at 06:37

## 2018-07-04 RX ADMIN — ACETAMINOPHEN 650 MG: 325 TABLET ORAL at 15:13
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
## 2018-07-04 NOTE — ASSESSMENT & PLAN NOTE
· CT revealed moderate hydronephrosis and hydroureter with a 2mm calculus in the proximal ureter and [id number] calculus either in the intramural portion of the distal most right ureter or the urinary bladder causing or has recently caused distal ureteral obstruction per the result report  · Urology consult appreciated seems stone has passed to the bladder but now there is a second stone and  uro feel that pt will likely pass this stone in ureter that is dilated from first stone   · Due to increased creatinine would monitor over night before plan for discharge as creatine is improving   · Patient reports that he had a recent (last Thursday) renal US     ·  unable to find any records from [facility] with these results or why this test was performed and patient is unsure himself

## 2018-07-04 NOTE — ASSESSMENT & PLAN NOTE
· See CT findings as per above  · Urology consult appreciated   · Patient was made NPO in ED,   · Patient reports that he had kidney stones in the past, had a stent placed which he reports was later removed

## 2018-07-04 NOTE — PROGRESS NOTES
Patient seen and examined  The patient's main complaint is right shoulder pain  He states that his flank pain has completely resolved  CT of the pelvis yesterday did demonstrate that the stone had passed and was in the bladder  His creatinine is slowly improving  I discussed with the patient that he does have 1 smaller stone in the ureter  This should hopefully pass without difficulty given the dilation of the ureter from the initial stone  We will need to continue to monitor him until tomorrow to make sure his kidney function continues to improve and he does not have any recurrent pain due to the 2nd stone  Otherwise he should follow up closely with Urology in the outpatient setting in a few weeks

## 2018-07-04 NOTE — ASSESSMENT & PLAN NOTE
· CT of the chest abdomen and pelvis revealed 1 2 cm exophytic solid-appearing nodule arising from the pancreatic tail anteriorly image 2/55   Partially solid and cystic nodule arising from the cephalad aspect of the pancreatic tail measuring 1 8 cm image 2/56 "  Nonemergent, outpatient MRI was recommended by radiologist  · - will place on discharge paper work for pt

## 2018-07-04 NOTE — ASSESSMENT & PLAN NOTE
· Patient reports his home insulin regimen is as follows:  · 65 units 70/30 QAM and 60 units 70/30 at dinner  · He also takes metformin BID  · Order home insulin regimen  · Orals will be on hold while hospitalized  · Add SSI coverage  ·  HbA1c 6 7  · Diabetic diet once no longer NPO but on clears would look to increase diet   · Continue home dose gabapentin for peripheral neuropathy  (P) 155

## 2018-07-04 NOTE — PROGRESS NOTES
NEPHROLOGY PROGRESS NOTE   Adonay Gomes  70 y o  male MRN: [id number]  Unit/Bed#: [facility] 039-39 Encounter: [id number]  Reason for Consult:  Acute kidney injury    ASSESSMENT/PLAN:  1  Acute kidney injury, likely multifactorial, component of obstruction plus prerenal given poor intake also combination of NSAIDs plus angiotensin receptor blocker  2  Stage 3 chronic kidney disease, baseline creatinine 1 7-1 8  3  Right-sided hydronephrosis with hydroureter with recent stone passage  4  Status post fall -CPK mildly elevated at 979    PLAN:  · Overall renal function is improving  · Discontinue IV fluids, oral intake appears to be adequate  · Continue to hold angiotensin receptor blocker as well as diuretics    SUBJECTIVE:  Seen and examined  Patient awake alert  Right flank pain has resolved  Denies any significant chest pain or shortness of Breath  Appetite seems to be improving  Tolerating clear liquids  Review of Systems    OBJECTIVE:  Current Weight: Weight - Scale: (!) 148 kg (327 lb 4 8 oz)  Vitals:    07/03/18 1551 07/03/18 2330 07/04/18 0551 07/04/18 0718   BP: 141/72 145/74  161/84   BP Location:    Right arm   Pulse: 95 83  76   Resp: 20 20  20   Temp: 97 9 °F (36 6 °C) 98 5 °F (36 9 °C)  97 8 °F (36 6 °C)   TempSrc: Oral   Oral   SpO2: 96% 96%  96%   Weight: (!) 148 kg (327 lb 1 6 oz)  (!) 148 kg (327 lb 4 8 oz)    Height: 5' 8" (1 727 m)          Intake/Output Summary (Last 24 hours) at 07/04/18 1059  Last data filed at 07/04/18 0611   Gross per 24 hour   Intake          5118 32 ml   Output             1050 ml   Net          4068 32 ml       Physical Exam   Constitutional: He is oriented to person, place, and time  No distress  HENT:   Head: Normocephalic  Eyes: No scleral icterus  Neck: Neck supple  Cardiovascular: Normal rate and regular rhythm  Pulmonary/Chest: Effort normal and breath sounds normal    Abdominal: Soft  He exhibits distension  Musculoskeletal: He exhibits no edema  Neurological: He is alert and oriented to person, place, and time  Skin: Skin is warm and dry  Psychiatric: He has a normal mood and affect         Medications:    Current Facility-Administered Medications:     acetaminophen (TYLENOL) tablet 650 mg, 650 mg, Oral, Q6H PRN, Arun Lara PA-C, 650 mg at 07/04/18 1137    amLODIPine (NORVASC) tablet 10 mg, 10 mg, Oral, Daily, Arun Lara PA-C, 10 mg at 07/04/18 0913    atorvastatin (LIPITOR) tablet 40 mg, 40 mg, Oral, Daily With [employer], KENDALL    gabapentin (NEURONTIN) capsule 300 mg, 300 mg, Oral, TID, Arun Lara PA-C, 300 mg at 07/04/18 0913    heparin (porcine) subcutaneous injection 5,000 Units, 5,000 Units, Subcutaneous, Q8H [address], 5,000 Units at 07/04/18 0516 **AND** Platelet count, , , Once, Arun Lara PA-C    insulin aspart protamine-insulin aspart (NovoLOG 70/30) 100 units/mL subcutaneous injection 30 Units, 30 Units, Subcutaneous, Before [employer], KENDALL    insulin aspart protamine-insulin aspart (NovoLOG 70/30) 100 units/mL subcutaneous injection 65 Units, 65 Units, Subcutaneous, Daily Before Breakfast, Arun Lara PA-C, 65 Units at 07/04/18 0913    insulin lispro (HumaLOG) 100 units/mL subcutaneous injection 1-5 Units, 1-5 Units, Subcutaneous, TID AC **AND** Fingerstick Glucose (POCT), , , TID AC, Arun Lara PA-C    insulin lispro (HumaLOG) 100 units/mL subcutaneous injection 1-6 Units, 1-6 Units, Subcutaneous, HS, Vika Reza PA-C, 1 Units at 07/03/18 2211    metoprolol succinate (TOPROL-XL) 24 hr tablet 100 mg, 100 mg, Oral, Daily, Arun Lara PA-C, 100 mg at 07/04/18 0913    ondansetron (ZOFRAN) injection 4 mg, 4 mg, Intravenous, Q4H PRN, Arun Lara PA-C, 4 mg at 07/03/18 1447    oxyCODONE (ROXICODONE) IR tablet 2 5 mg, 2 5 mg, Oral, Q4H PRN, Arun Lara PA-C, 2 5 mg at 07/04/18 0639    pneumococcal 13-valent conjugate vaccine (PREVNAR-13) IM injection 0 5 mL, 0 5 mL, Intramuscular, Prior to discharge, Opal Fuentes,     promethazine (PHENERGAN) injection 25 mg, 25 mg, Intravenous, Q4H PRN, Adolphus Boas, PA-C    sodium chloride 0 9 % infusion, 100 mL/hr, Intravenous, Continuous, Rahul Ramírez PA-C, Last Rate: 100 mL/hr at 07/04/18 0425, 100 mL/hr at 07/04/18 0425    Laboratory Results:    Results from last 7 days  Lab Units 07/04/18  0549 07/03/18  1156 07/03/18  1154   WBC Thousand/uL 7 77 10 80*  --    HEMOGLOBIN g/dL 12 0 13 9  --    I STAT HEMOGLOBIN g/dl  --   --  13 6   HEMATOCRIT % 37 2 42 6  --    PLATELETS Thousands/uL 141* 164  --    SODIUM mmol/L 138 143  --    POTASSIUM mmol/L 5 1 4 6  --    CHLORIDE mmol/L 107 108  --    CO2 mmol/L 26 24  --    BUN mg/dL 41* 46*  --    CREATININE mg/dL 2 48* 3 39*  --    CALCIUM mg/dL 8 2* 9 2  --    MAGNESIUM mg/dL 2 1  --   --    PHOSPHORUS mg/dL 3 5  --   --    GLUCOSE RANDOM mg/dL 117 185*  --    GLUCOSE, ISTAT mg/dl  --   --  188*
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
## 2018-07-04 NOTE — PROGRESS NOTES
Prone CT pelvis revealed no evidence of distal ureteral calculus  5mm bladder calculus identified per the results report  Will place patient on diet now  Per nursing, patient only requesting to have ginger ale and Jell-o tonight  Will place on clear liquid diet for now  Discussed insulin regimen with Dr Bonny Peter  Last glucose check high at 193  Will cut edil's 70/30 dose in half  Check overnight blood sugar  Hypoglycemia protocol ordered
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
## 2018-07-04 NOTE — ASSESSMENT & PLAN NOTE
· Creatinine found to be 3 39 on admission now 2 48 (baseline about 1 7 to 1 8)  · He reports seeing Dr Shanda Sesay of [facility]  · He had a recent (last Thursday) renal US - unable to find these results  · Nephrology consult  · Suspect patient has some underlying CKD, but this likely represents an ESTELLA in the setting of the above and poor PO intake with vomiting  · - at this time fluids will be discontinued per renal   · Metformin, losartan, and furosemide will be on hold for now

## 2018-07-05 VITALS
HEIGHT: 68 IN | RESPIRATION RATE: 20 BRPM | SYSTOLIC BLOOD PRESSURE: 140 MMHG | HEART RATE: 67 BPM | TEMPERATURE: 98.4 F | WEIGHT: 315 LBS | OXYGEN SATURATION: 93 % | BODY MASS INDEX: 47.74 KG/M2 | DIASTOLIC BLOOD PRESSURE: 65 MMHG

## 2018-07-05 PROBLEM — N13.5 URETERAL OBSTRUCTION: Status: RESOLVED | Noted: 2018-07-03 | Resolved: 2018-07-05

## 2018-07-05 PROBLEM — R79.89 ELEVATED SERUM CREATININE: Status: RESOLVED | Noted: 2018-07-03 | Resolved: 2018-07-05
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
## 2018-07-05 PROBLEM — N17.9 AKI (ACUTE KIDNEY INJURY) (HCC): Status: RESOLVED | Noted: 2018-07-03 | Resolved: 2018-07-05

## 2018-07-05 LAB
ANION GAP SERPL CALCULATED.3IONS-SCNC: 5 MMOL/L (ref 4–13)
BUN SERPL-MCNC: 31 MG/DL (ref 5–25)
CALCIUM SERPL-MCNC: 8.4 MG/DL (ref 8.3–10.1)
CHLORIDE SERPL-SCNC: 106 MMOL/L (ref 100–108)
CO2 SERPL-SCNC: 28 MMOL/L (ref 21–32)
CREAT SERPL-MCNC: 1.94 MG/DL (ref 0.6–1.3)
GFR SERPL CREATININE-BSD FRML MDRD: 34 ML/MIN/1.73SQ M
GLUCOSE SERPL-MCNC: 129 MG/DL (ref 65–140)
GLUCOSE SERPL-MCNC: 133 MG/DL (ref 65–140)
POTASSIUM SERPL-SCNC: 4.2 MMOL/L (ref 3.5–5.3)
SODIUM SERPL-SCNC: 139 MMOL/L (ref 136–145)

## 2018-07-05 PROCEDURE — 82948 REAGENT STRIP/BLOOD GLUCOSE: CPT

## 2018-07-05 PROCEDURE — 80048 BASIC METABOLIC PNL TOTAL CA: CPT | Performed by: INTERNAL MEDICINE

## 2018-07-05 PROCEDURE — 99233 SBSQ HOSP IP/OBS HIGH 50: CPT | Performed by: NURSE PRACTITIONER

## 2018-07-05 PROCEDURE — 99232 SBSQ HOSP IP/OBS MODERATE 35: CPT | Performed by: NURSE PRACTITIONER

## 2018-07-05 PROCEDURE — 99239 HOSP IP/OBS DSCHRG MGMT >30: CPT | Performed by: INTERNAL MEDICINE

## 2018-07-05 RX ORDER — LOSARTAN POTASSIUM 100 MG/1
100 TABLET ORAL DAILY
Qty: 30 TABLET | Refills: 0 | Status: SHIPPED | OUTPATIENT
Start: 2018-07-12

## 2018-07-05 RX ORDER — FUROSEMIDE 40 MG/1
40 TABLET ORAL DAILY
Qty: 30 TABLET | Refills: 0 | Status: SHIPPED | OUTPATIENT
Start: 2018-07-12

## 2018-07-05 RX ADMIN — INSULIN ASPART 65 UNITS: 100 INJECTION, SUSPENSION SUBCUTANEOUS at 08:22

## 2018-07-05 RX ADMIN — OXYCODONE HYDROCHLORIDE 2.5 MG: 5 TABLET ORAL at 00:17

## 2018-07-05 RX ADMIN — METOPROLOL SUCCINATE 100 MG: 100 TABLET, EXTENDED RELEASE ORAL at 08:21

## 2018-07-05 RX ADMIN — HEPARIN SODIUM 5000 UNITS: 5000 INJECTION, SOLUTION INTRAVENOUS; SUBCUTANEOUS at 06:28

## 2018-07-05 RX ADMIN — AMLODIPINE BESYLATE 10 MG: 10 TABLET ORAL at 08:21

## 2018-07-05 RX ADMIN — GABAPENTIN 300 MG: 300 CAPSULE ORAL at 08:21

## 2018-07-05 RX ADMIN — OXYCODONE HYDROCHLORIDE 2.5 MG: 5 TABLET ORAL at 08:21

## 2018-07-05 NOTE — PROGRESS NOTES
UROLOGY PROGRESS NOTE   Patient Identifiers: Ross Das (MRN [id number])  Date of Service: 7/5/2018    Subjective:     24 HR EVENTS:   no significant events  Patient has  no complaints  He continues to have right shoulder discomfort  He denies any flank pain  Objective:     VITALS:    Vitals:    07/05/18 0657   BP: 140/65   Pulse: 67   Resp: 20   Temp: 98 4 °F (36 9 °C)   SpO2: 93%       INS & OUTS:  I/O last 24 hours:   In: 3348 7 [P O :2767; I V :581 7]  Out: 1200 [Urine:1200]    LABS:  Lab Results   Component Value Date    HGB 12 0 07/04/2018    HCT 37 2 07/04/2018    WBC 7 77 07/04/2018     (L) 07/04/2018   ]    Lab Results   Component Value Date     07/05/2018    K 4 2 07/05/2018     07/05/2018    CO2 28 07/05/2018    BUN 31 (H) 07/05/2018    CREATININE 1 94 (H) 07/05/2018    CALCIUM 8 4 07/05/2018    GLUCOSE 129 07/05/2018   ]    INPATIENT MEDS:    Current Facility-Administered Medications:     acetaminophen (TYLENOL) tablet 650 mg, 650 mg, Oral, Q6H PRN, Prabha Alexis PA-C, 650 mg at 07/04/18 1513    amLODIPine (NORVASC) tablet 10 mg, 10 mg, Oral, Daily, Prabha Alexis PA-C, 10 mg at 07/04/18 0913    atorvastatin (LIPITOR) tablet 40 mg, 40 mg, Oral, Daily With Rick Fox PA-C, 40 mg at 07/04/18 1724    gabapentin (NEURONTIN) capsule 300 mg, 300 mg, Oral, TID, Prabha Alexis PA-C, 300 mg at 07/04/18 2211    heparin (porcine) subcutaneous injection 5,000 Units, 5,000 Units, Subcutaneous, Q8H [address], 5,000 Units at 07/05/18 9086 **AND** Platelet count, , , Once, Prabha Alexis PA-C    insulin aspart protamine-insulin aspart (NovoLOG 70/30) 100 units/mL subcutaneous injection 30 Units, 30 Units, Subcutaneous, Before Rick Fox PA-C, 30 Units at 07/04/18 1724    insulin aspart protamine-insulin aspart (NovoLOG 70/30) 100 units/mL subcutaneous injection 65 Units, 65 Units, Subcutaneous, Daily Before Breakfast, Prabha Alexis PA-C, 65 Units at 07/04/18 0913    insulin lispro (HumaLOG) 100 units/mL subcutaneous injection 1-5 Units, 1-5 Units, Subcutaneous, TID AC **AND** Fingerstick Glucose (POCT), , , TID AC, David Alvarez PA-C    insulin lispro (HumaLOG) 100 units/mL subcutaneous injection 1-6 Units, 1-6 Units, Subcutaneous, HS, Hudson Schultz PA-C, 1 Units at 07/03/18 2211    metoprolol succinate (TOPROL-XL) 24 hr tablet 100 mg, 100 mg, Oral, Daily, David Alvarez PA-C, 100 mg at 07/04/18 0913    ondansetron (ZOFRAN) injection 4 mg, 4 mg, Intravenous, Q4H PRN, David Alvarez PA-C, 4 mg at 07/03/18 1447    oxyCODONE (ROXICODONE) IR tablet 2 5 mg, 2 5 mg, Oral, Q4H PRN, David Alvarez PA-C, 2 5 mg at 07/05/18 0017    pneumococcal 13-valent conjugate vaccine (PREVNAR-13) IM injection 0 5 mL, 0 5 mL, Intramuscular, Prior to discharge, Colan Laurie, DO    promethazine (PHENERGAN) injection 25 mg, 25 mg, Intravenous, Q4H PRN, Winnie Lainez PA-C      Physical Exam:     GEN: alert and oriented x 3    RESP: breathing comfortably with no accessory muscle use    ABD: soft, non-tender, non-distended   EXT: no significant peripheral edema   :  Voiding without difficulty, no gross hematuria or dysuria    RADIOLOGY:   CT pelvis wo contrast 7/3/18    VISUALIZED KIDNEYS/URETERS:  Kidney not visualized  No evidence of distal ureteral calculus  5 mm bladder calculus      REPRODUCTIVE ORGANS:  The prostate is enlarged      URINARY BLADDER:  Unremarkable  IMPRESSION:        1  No evidence of distal ureteral calculus  5 mm bladder calculus  2   Prostatomegaly  Posterior bladder soft tissue thickening (prostatic protrusion at the bladder base or other etiology?), correlate with nonemergent outpatient bladder ultrasound to exclude transitional cell carcinoma  Assessment:   71 y/o male who initially presented to ER with right-sided flank pain    CT scan identified 2 mm right proximal ureteral stone and 3 x 6 mm distal right ureteral stone versus bladder small stone with the right ureteral orifice  A repeat CT scan of the pelvis demonstrated that the stone had passed it was in the bladder  Creatinine continues to improve  It is now 1 94 from 2 48 yesterday  Negative CVA tenderness, patient denies any flank pain  Plan:   -Patient's creatinine continues to improve and he remains asymptomatic  At this time no further urologic intervention is required  Patient states that he has been managed by [facility] for several years  He has an upcoming appointment this month  Patient would like to continue care with them  Advised patient to call his primary urologist upon discharge from hospital   Patient is amenable with this plan  All questions answered  Urology signing off, please not hesitate to call with any questions or concerns      RN participated in rounds with AP  Plan of care discussed with patient and RN

## 2018-07-05 NOTE — PLAN OF CARE
GENITOURINARY - ADULT     Maintains or returns to baseline urinary function Progressing     Absence of urinary retention Progressing        Nutrition/Hydration-ADULT     Nutrient/Hydration intake appropriate for improving, restoring or maintaining nutritional needs Progressing        Potential for Falls     Patient will remain free of falls Progressing

## 2018-07-05 NOTE — DISCHARGE SUMMARY
Discharge Summary - [facility] Internal Medicine    Patient Information: Severiano Cantor  70 y o  male MRN: [id number]  Unit/Bed#: [facility] 607-01 Encounter: [id number]    Discharging Physician / Practitioner: Joy Linton MD  PCP: No primary care provider on file  Admission Date: 7/3/2018  Discharge Date: 07/05/18    Reason for Admission: fall    Discharge Diagnoses:     Principal Problem:    Hydronephrosis of right kidney  Active Problems:    Type 2 diabetes mellitus with neurologic complication, with long-term current use of insulin ([facility])    Hypertension    Hyperlipidemia    Morbid obesity ([facility] [address] )    Pancreas cyst  Resolved Problems:    Ureteral obstruction    ESTELLA (acute kidney injury) ([facility] [address] )    Fall down stairs      Consultations During Hospital Stay:  · Dr Jacki Sherman Urology  · Dr Kaylen Suarez Nephrology    Procedures Performed:     · CT chest abdomen pelvis - right hydronephrosis and hydroureter; renal calculi as listed  · CT pelvis - 2 mm calculus right-sided      Incidental Findings:   · None    Test Results Pending at Discharge (will require follow up): · None     Outpatient Tests Requested:  · bmp    Complications:  None    Hospital Course:     Severiano Cantor  is a 70 y o  male patient who originally presented to the hospital on 7/3/2018 due to a fall  He fell down 12 steps and was initially evaluated by Trauma and cleared before admitting to medical service  He was found to have a creatinine of 3 39 associated with a CT finding of a right moderate hydronephrosis and hydroureter with a 2 mm calculus in the proximal ureter and a 3 mm x 6 mm calculus in the distal ureter  He was evaluated by Urology who did a repeat CT pelvis and the patient seems to have already passed the culprit calculus and therefore no procedures were undertaken  He was given some IV hydration with improvement of the creatinine to 1 94 which is near to his baseline of 1 5-1 7    He was discharged in a stable condition with follow-up by his regular nephrologist Dr Orly Talley at [facility]  Please see hospital records for details    Condition at Discharge: fair     Discharge Day Visit / Exam:     Vitals: Blood Pressure: 140/65 (07/05/18 0657)  Pulse: 67 (07/05/18 0657)  Temperature: 98 4 °F (36 9 °C) (07/05/18 0657)  Temp Source: Oral (07/05/18 0657)  Respirations: 20 (07/05/18 0657)  Height: 5' 8" (172 7 cm) (07/03/18 1551)  Weight - Scale: (!) 148 kg (327 lb 4 8 oz) (07/04/18 0551)  SpO2: 93 % (07/05/18 0657)  Exam:   Physical Exam   Constitutional: He is oriented to person, place, and time  HENT:   Head: Normocephalic  Eyes: Pupils are equal, round, and reactive to light  Cardiovascular: Normal heart sounds  Pulmonary/Chest: Effort normal    Abdominal: Soft  Neurological: He is alert and oriented to person, place, and time  Discharge instructions/Information to patient and family:   See after visit summary for information provided to patient and family  Provisions for Follow-Up Care:  See after visit summary for information related to follow-up care and any pertinent home health orders  Disposition: Home    Discharge Statement:  I spent 35 minutes discharging the patient  This time was spent on the day of discharge  I had direct contact with the patient on the day of discharge  Greater than 50% of the total time was spent examining patient, answering all patient questions, arranging and discussing plan of care with patient as well as directly providing post-discharge instructions  Additional time then spent on discharge activities  Discharge Medications:  See after visit summary for reconciled discharge medications provided to patient and family  ** Please Note: Dragon 360 Dictation voice to text software may have been used in the creation of this document   **

## 2018-07-05 NOTE — SOCIAL WORK
CM met with patient lives in a house with his spouse 3 steps to entrance  Fills prescription at [facility]  Spouse is POA as per pt  Denies ETOH, mental health dx or any mental health  inpatient stays  Pt reports he has a PCP   Patient medically cleared and was discharged home with spouse  CM reviewed d/c planning process including the following: identifying help at home, patient preference for d/c planning needs, Discharge Lounge, [facility] Meds to Bed program, availability of treatment team to discuss questions or concerns patient and/or family may have regarding understanding medications and recognizing signs and symptoms once discharged  CM also encouraged patient to follow up with all recommended appointments after discharge  Patient advised of importance for patient and family to participate in managing patients medical well being  Patient/caregiver received discharge checklist  Content reviewed  Patient/caregiver encouraged to participate in discharge plan of care prior to discharge home

## 2018-07-05 NOTE — PROGRESS NOTES
NEPHROLOGY PROGRESS NOTE   Luips Cardoza  70 y o  male MRN: [id number]  Unit/Bed#: [facility] 711-12 Encounter: [id number]  Reason for Consult: ESTELLA/CKD    ASSESSMENT/PLAN:  1  Acute kidney injury:  Multifactorial, Suspect secondary to obstruction with nephrolithiasis, prerenal with decreased oral intake with nausea vomiting, along with medications to include losartan, furosemide, and metformin, S/P fall  -Renal function improved and 1 92, almost back to baseline  -avoid nephrotoxins  -avoid hypotension  -follow I/O, lab values in volume status     2  Chronic kidney disease III:  Baseline creatinine 1 7-1 8 dating back to 2016  -follows Dr Lamar Flores at [facility] seen December, 2017  -etiology likely secondary to diabetes and hypertension  -For follow up in next 2/3 weeks per patient report     3  Hypertension:  BP acceptable  -metoprolol and amlodipine continue per primary     4   Right hydronephrosis: Recently passed  -for urology follow up as outpatient    5  Status post fall:  Imaging did not reveal fractures or abnormalitis     6  Incidental pancreatic cysts:  Seen on CT scan  -radiology recommended outpatient MRI follow-up-per primary team laced on discharge paperwork for followup as outpatinet       Disposition:  Renal function has improved and okay from renal standpoint for discharge with follow up with [facility]    SUBJECTIVE:  Patient seen and examined    Feeling better, but still having shoulder pain    OBJECTIVE:  Current Weight: Weight - Scale: (!) 148 kg (327 lb 4 8 oz)  Vitals:    07/04/18 0718 07/04/18 1513 07/04/18 2242 07/05/18 0657   BP: 161/84 156/69 162/90 140/65   BP Location: Right arm Left arm Right arm Right arm   Pulse: 76 63 58 67   Resp: 20 18 18 20   Temp: 97 8 °F (36 6 °C) 98 3 °F (36 8 °C) 97 5 °F (36 4 °C) 98 4 °F (36 9 °C)   TempSrc: Oral Oral Oral Oral   SpO2: 96% 96% 96% 93%   Weight:       Height:           Intake/Output Summary (Last 24 hours) at 07/05/18 1027  Last data filed at 07/05/18 0500   Gross per 24 hour   Intake          2568 67 ml   Output              900 ml   Net          1668 67 ml     General: cooperative, not in acute distress  Skin: no rash, warm and dry  Eyes: pale conjunctivae, anicteric sclerae  ENT: moist lips and mucous membranes  Neck: supple, no JVD noted  Chest: clear breath sounds, decreased bases, no wheeze or crackles  CVS:  normal rate, regular rhythm  Abdomen: soft, non-tender, non-distended, normoactive bowel sounds  Extremities:  Trace edema bilaterally, right lower leg deformity; status post skin graft  Neuro: awake, alert  Psych: appropriate affect     Medications:    Current Facility-Administered Medications:     acetaminophen (TYLENOL) tablet 650 mg, 650 mg, Oral, Q6H PRN, MICHAEL Dockery-ADÁN, 650 mg at 07/04/18 1513    amLODIPine (NORVASC) tablet 10 mg, 10 mg, Oral, Daily, MICHAEL Dockery-C, 10 mg at 07/05/18 5560    atorvastatin (LIPITOR) tablet 40 mg, 40 mg, Oral, Daily With MICHAEL Branham-ADÁN, 40 mg at 07/04/18 1724    gabapentin (NEURONTIN) capsule 300 mg, 300 mg, Oral, TID, Latia Chew PA-C, 300 mg at 07/05/18 3710    heparin (porcine) subcutaneous injection 5,000 Units, 5,000 Units, Subcutaneous, Q8H [address], 5,000 Units at 07/05/18 6489 **AND** Platelet count, , , Once, Latia Chew PA-C    insulin aspart protamine-insulin aspart (NovoLOG 70/30) 100 units/mL subcutaneous injection 30 Units, 30 Units, Subcutaneous, Before Leane MICHAEL Ni-ADÁN, 30 Units at 07/04/18 1724    insulin aspart protamine-insulin aspart (NovoLOG 70/30) 100 units/mL subcutaneous injection 65 Units, 65 Units, Subcutaneous, Daily Before Breakfast, Latia Chew PA-C, 65 Units at 07/05/18 0822    insulin lispro (HumaLOG) 100 units/mL subcutaneous injection 1-5 Units, 1-5 Units, Subcutaneous, TID AC **AND** Fingerstick Glucose (POCT), , , TID AC, Lorriane Parent, PA-C    insulin lispro (HumaLOG) 100 units/mL subcutaneous injection 1-6 Units, 1-6 Units, Subcutaneous, HS, Izaguirre Gallop, Render Hof, 1 Units at 07/03/18 2211    metoprolol succinate (TOPROL-XL) 24 hr tablet 100 mg, 100 mg, Oral, Daily, Kevin Britton PA-C, 100 mg at 07/05/18 4705    ondansetron [facility]) injection 4 mg, 4 mg, Intravenous, Q4H PRN, Kevin Britton PA-C, 4 mg at 07/03/18 1447    oxyCODONE (ROXICODONE) IR tablet 2 5 mg, 2 5 mg, Oral, Q4H PRN, Kevin Britton PA-C, 2 5 mg at 07/05/18 0821    pneumococcal 13-valent conjugate vaccine (PREVNAR-13) IM injection 0 5 mL, 0 5 mL, Intramuscular, Prior to discharge, Jackie Hinds DO    promethazine (PHENERGAN) injection 25 mg, 25 mg, Intravenous, Q4H PRN, Daryl Doll PA-C    Laboratory Results:    Results from last 7 days  Lab Units 07/05/18  0631 07/04/18  0549 07/03/18  1156 07/03/18  1154   WBC Thousand/uL  --  7 77 10 80*  --    HEMOGLOBIN g/dL  --  12 0 13 9  --    I STAT HEMOGLOBIN g/dl  --   --   --  13 6   HEMATOCRIT %  --  37 2 42 6  --    PLATELETS Thousands/uL  --  141* 164  --    SODIUM mmol/L 139 138 143  --    POTASSIUM mmol/L 4 2 5 1 4 6  --    CHLORIDE mmol/L 106 107 108  --    CO2 mmol/L 28 26 24  --    BUN mg/dL 31* 41* 46*  --    CREATININE mg/dL 1 94* 2 48* 3 39*  --    CALCIUM mg/dL 8 4 8 2* 9 2  --    MAGNESIUM mg/dL  --  2 1  --   --    PHOSPHORUS mg/dL  --  3 5  --   --    GLUCOSE RANDOM mg/dL 129 117 185*  --    GLUCOSE, ISTAT mg/dl  --   --   --  188*